# Patient Record
Sex: FEMALE | Race: WHITE | NOT HISPANIC OR LATINO | Employment: OTHER | URBAN - METROPOLITAN AREA
[De-identification: names, ages, dates, MRNs, and addresses within clinical notes are randomized per-mention and may not be internally consistent; named-entity substitution may affect disease eponyms.]

---

## 2018-07-05 ENCOUNTER — OFFICE VISIT (OUTPATIENT)
Dept: OBSTETRICS AND GYNECOLOGY | Facility: CLINIC | Age: 43
End: 2018-07-05

## 2018-07-05 VITALS
HEIGHT: 66 IN | WEIGHT: 180.2 LBS | SYSTOLIC BLOOD PRESSURE: 93 MMHG | DIASTOLIC BLOOD PRESSURE: 63 MMHG | BODY MASS INDEX: 28.96 KG/M2 | HEART RATE: 82 BPM

## 2018-07-05 DIAGNOSIS — N64.4 NIPPLE PAIN: Primary | ICD-10-CM

## 2018-07-05 DIAGNOSIS — R14.0 ABDOMINAL BLOATING: ICD-10-CM

## 2018-07-05 DIAGNOSIS — R63.4 WEIGHT LOSS: ICD-10-CM

## 2018-07-05 DIAGNOSIS — N63.20 LEFT BREAST LUMP: ICD-10-CM

## 2018-07-05 DIAGNOSIS — R53.83 DECREASED ENERGY: ICD-10-CM

## 2018-07-05 DIAGNOSIS — Z11.3 SCREEN FOR STD (SEXUALLY TRANSMITTED DISEASE): ICD-10-CM

## 2018-07-05 DIAGNOSIS — Z12.4 SCREENING FOR CERVICAL CANCER: ICD-10-CM

## 2018-07-05 DIAGNOSIS — R10.32 LLQ PAIN: ICD-10-CM

## 2018-07-05 PROCEDURE — 99204 OFFICE O/P NEW MOD 45 MIN: CPT | Performed by: OBSTETRICS & GYNECOLOGY

## 2018-07-05 NOTE — PROGRESS NOTES
Subjective   Abena Alamo is a 43 y.o. female here as new patient. Pt c/o bloating, nasuea, tired, breast tenderness, lighter periods.  Neg pregnancy test    History of Present Illness   Patient presents as a new patient to me for evaluation of several vague in possibly unrelated complaints.  The patient complains of nipples increasing in size, nipple tenderness, abdominal bloating, left lower quadrant pain, nausea and vomiting, decreased energy.  She reports that she has seen her primary care physician who did some blood work recently, but she is unaware of what blood work was performed.  There were no records sent from her primary care physician other than a pelvic ultrasound which was performed October 2017.  Patient states at that time she had the ultrasound performed that she had started having some pain with intercourse, but that has subsequently resolved.  The patient states that she believes in holistic medicine and does not like to take classical medicines.  She takes a few types of herbal supplements that are not familiar to me.  Patient states that she does not believe in mammograms and would never have biopsies performed, even if there were very concerning or suspicious mass seen on mammogram or ultrasound, but she does not believe in standard breast cancer therapies.  She states that she would try her on holistic therapy herself.  Bleeding back to her primary concerns, the patient states her biggest concern is with nipple tenderness.  She states this is very upsetting to her.  She also feels that her nipples have changed in size.  She states this is despite about a 15 pound weight loss over the last 1-2 months.  She reports significant recent stressful life events including problems at work and health issues related to her close friend of hers.  Patient has never had a mammogram 4.  She has no family history of breast cancer.  Patient reports minimal caffeine intake, limited to about a cup of green tea  per day.  It is unclear what sorts of caffeine or naturally occurring caffeine may be in some of her herbal supplements however.  Patient is not a smoker.  Patient also reports vague symptoms of abdominal bloating and abdominal discomfort.  Patient states that she has tried elimination diets in the past.  Recently she's been on a liquid only cleanse.  She has also recently tried raw diet.  She denies constipation or diarrhea and reports normal daily bowel movements.  Patient also reports just vague generalized fatigue.  Patient states that she felt like her recent symptoms may have been due to pregnancy.  She is sexually active and she has never been on contraception in the past.  Her last child was born 16 years ago.  Patient states that she took pregnancy test at home that were negative.  Patient states that she cannot regular last time that she had a Pap smear.  She denies any history of sexually transmitted diseases    Obstetric History       T1      L1     SAB0   TAB0   Ectopic0   Molar0   Multiple0   Live Births1       # Outcome Date GA Lbr Christian/2nd Weight Sex Delivery Anes PTL Lv   1 Term 92    F Vag-Spont   RUSS        History reviewed. No pertinent past medical history.     Past Surgical History:   Procedure Laterality Date   • CHOLECYSTECTOMY     • EYE SURGERY       History reviewed. No pertinent family history.     Social History   Substance Use Topics   • Smoking status: Former Smoker   • Smokeless tobacco: Never Used   • Alcohol use No     Allergies   Allergen Reactions   • Amoxicillin Nausea And Vomiting   • Codeine Nausea And Vomiting   • Penicillins Nausea And Vomiting   • Sulfa Antibiotics Nausea And Vomiting   • Latex Rash     No current outpatient prescriptions on file.    Review of Systems  General: No fever or chills  Constitutional: Pos weight loss, no hair loss  HENT: No headache, no hearing loss, no tinnitus  Eyes: normal vision, no eye pain  Lungs: No cough, no shortness of  "breath  Heart: No chest pain, no palpitations  Abdomen: Pos nausea, No vomiting, constipation or diarrhea  : No dysuria, no hematuria  Skin: No rashes  Lymph: Pos LE swelling  Neuro: No parathesia, no weakness  Psych: Normal though content, no hallucinations, no SI/HI    Objective   Physical Exam  Vitals:    07/05/18 1340   BP: 93/63   Pulse: 82   Weight: 81.7 kg (180 lb 3.2 oz)   Height: 167.6 cm (66\")   Patient's last menstrual period was 06/14/2018.   Exam performed in the presence of a female chaperone  Patient has provided verbal consent to proceed with exam.    Gen: No acute distress, awake and oriented times three  HENT: Normocephalic, atraumatic, Moist mucous membranes  Eyes: PERRLA, EOMI  Neck: Supple, normal range of motion, no thyromegaly  Lungs: Normal work of breathing, lungs clear bilaterally, no crackles/wheezes  Heart: Regular rate and rhythm, no murmurs  Abdomen: soft, nontender, non distended, normoactive bowel sounds  Breast: Symmetrical. No skin changes or nipple retractions. There is no discharge from the nipples.  Nipples generally appear normal.  On the left breast in the lower inner quadrant at about the 7 o'clock position is about a 1 x 1 cm smooth round nontender lump.  There is also a small cluster of lumps just medial to this aspect as well.  Right breast is normal to palpation with no lumps or tenderness.  Pelvic:   Normal external female genitalia, no lesions  Vagina: No blood or discharge  Cervix: No cervical motion tenderness, no lesions, no active bleeding, nonfriable  Uterus: Anteverted, normal size and shape, nontender  Adnexa: No masses or tenderness  Rectal: Deferred  Skin: Warm and dry, no rashes  Psych: Good judgement and insight, normal affect and mood  Neuro: CN 2-12 intact, no gross deficits    Assessment/Plan   Abena was seen today for new patient.    Diagnoses and all orders for this visit:    Nipple pain  -     Prolactin  -     Testosterone Free Direct  -     " Testosterone  -     Mammo Diagnostic Bilateral With CAD; Future  -     US breast left complete; Future         Patient is very skeptical and very hesitant proceed with any sort of breast imaging, because she states that she would not pursue any type of biopsy even of a have a suspicious lump on imaging.  Patient states that she does not believe in standard breast cancer therapies and would not seek out standard therapies.  Despite this, I would encourage her to go forward with imaging today as she does have some palpable lumps on the left breast.  Can also check hormone levels to see if there are hormonal changes or prolactin issues that cause the change in size for breast.  Advised the patient to minimize any caffeine intake.    LLQ pain  Abdominal bloating     -      Overall, I suspect that a GYN origin of her bloating and pain is unlikely.  We will get a pelvic ultrasound to exclude pelvic or adnexal masses.  I suspect this may be more related to GI sources, or even her diet.  Certainly diet exclusively consisting of fruits and raw vegetables may be likely to cause bloating, gas, and discomfort.  We will have her return in 2-4 weeks for ultrasound in GYN follow-up.  Left breast lump  -     Mammo Diagnostic Bilateral With CAD; Future  -     US breast left complete; Future    Weight loss  -     TSH Rfx On Abnormal To Free T4  -     Follicle Stimulating Hormone  -     Estradiol  -     Prolactin    Decreased energy  -     TSH Rfx On Abnormal To Free T4  -     Follicle Stimulating Hormone  -     Estradiol  -     Prolactin  -     Testosterone Free Direct  -     Testosterone    Screen for STD (sexually transmitted disease)  -     IGP, Apt HPV,rfx 16 / 18,45  -     Chlamydia trachomatis, Neisseria gonorrhoeae, Trichomonas vaginalis, PCR - Swab, Vagina    Screening for cervical cancer  -     IGP, Apt HPV,rfx 16 / 18,45    Overall, I feel that the patient's constellation of symptoms is unlikely related to a gynecologic  cause.  We will check basic hormonal evaluation today.  I have been unable to obtain the records from her primary care physician, but the patient states that she feels that no significant hormone levels were checked.  They did do a TSH which she states was mildly elevated, but no further testing was done.  She said she had a negative hCG level with her primary care physician.  Patient is also due for a Pap smear and gonorrhea and chlamydia testing, which were performed today as well.

## 2018-07-06 LAB
C TRACH RRNA SPEC QL NAA+PROBE: NEGATIVE
N GONORRHOEA RRNA SPEC QL NAA+PROBE: NEGATIVE
T VAGINALIS RRNA SPEC QL NAA+PROBE: NEGATIVE

## 2018-07-09 ENCOUNTER — TELEPHONE (OUTPATIENT)
Dept: OBSTETRICS AND GYNECOLOGY | Facility: CLINIC | Age: 43
End: 2018-07-09

## 2018-07-09 LAB
CYTOLOGIST CVX/VAG CYTO: NORMAL
CYTOLOGY CVX/VAG DOC THIN PREP: NORMAL
DX ICD CODE: NORMAL
HIV 1 & 2 AB SER-IMP: NORMAL
HPV I/H RISK 4 DNA CVX QL PROBE+SIG AMP: NEGATIVE
OTHER STN SPEC: NORMAL
PATH REPORT.FINAL DX SPEC: NORMAL
STAT OF ADQ CVX/VAG CYTO-IMP: NORMAL

## 2018-07-09 NOTE — TELEPHONE ENCOUNTER
----- Message from Mc Shelton MD sent at 7/8/2018  1:39 PM EDT -----  Notify the patient that her chlamydia/gonorrhea test was negative. I do not see any blood work results. Did she get it done?

## 2018-07-09 NOTE — TELEPHONE ENCOUNTER
Pt aware. She did not get blood drawn because she thought her PCP was going to send her most recent labs, however I called them and they'd only done CBC, CMP, lipids and UA. I told her the labs you ordered were different, she said she will come in and get those drawn.

## 2018-07-10 ENCOUNTER — TELEPHONE (OUTPATIENT)
Dept: OBSTETRICS AND GYNECOLOGY | Facility: CLINIC | Age: 43
End: 2018-07-10

## 2018-07-10 NOTE — TELEPHONE ENCOUNTER
Patient states that she refuses to have a mammogram.  Please notify the patient that I strongly recommend a mammogram both for screening for breast cancer and as well as a diagnostic approach for her breast lump.  Ultrasound alone is not sufficient to evaluate for these things.  If the patient still continues to refuse a mammogram, please at least see if she will schedule and perform her breast ultrasound.

## 2018-07-11 ENCOUNTER — TELEPHONE (OUTPATIENT)
Dept: OBSTETRICS AND GYNECOLOGY | Facility: CLINIC | Age: 43
End: 2018-07-11

## 2018-07-11 NOTE — TELEPHONE ENCOUNTER
----- Message from Mc Shelton MD sent at 7/9/2018  2:31 PM EDT -----  Notify the patient that her Pap was normal.

## 2018-07-11 NOTE — TELEPHONE ENCOUNTER
"I called to speak w/pt and tried to encourage her to schedule mammogram and, at least the breast u/s.  Pt stated she has already spoke w/Dr. Shelton about how strongly she does not want to have these procedures done.  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  Also states she feels like breast cancer \"can easily be healed on its own\", if that's what the dx ends up being.  "

## 2018-08-02 ENCOUNTER — PROCEDURE VISIT (OUTPATIENT)
Dept: OBSTETRICS AND GYNECOLOGY | Facility: CLINIC | Age: 43
End: 2018-08-02

## 2018-08-02 ENCOUNTER — OFFICE VISIT (OUTPATIENT)
Dept: OBSTETRICS AND GYNECOLOGY | Facility: CLINIC | Age: 43
End: 2018-08-02

## 2018-08-02 VITALS
DIASTOLIC BLOOD PRESSURE: 66 MMHG | WEIGHT: 179 LBS | BODY MASS INDEX: 28.77 KG/M2 | HEART RATE: 65 BPM | HEIGHT: 66 IN | SYSTOLIC BLOOD PRESSURE: 97 MMHG

## 2018-08-02 DIAGNOSIS — R14.0 ABDOMINAL BLOATING: ICD-10-CM

## 2018-08-02 DIAGNOSIS — R10.2 PELVIC PAIN IN FEMALE: Primary | ICD-10-CM

## 2018-08-02 DIAGNOSIS — R53.83 DECREASED ENERGY: ICD-10-CM

## 2018-08-02 DIAGNOSIS — N64.4 NIPPLE PAIN: ICD-10-CM

## 2018-08-02 DIAGNOSIS — D25.9 UTERINE LEIOMYOMA, UNSPECIFIED LOCATION: ICD-10-CM

## 2018-08-02 DIAGNOSIS — D25.1 INTRAMURAL LEIOMYOMA OF UTERUS: Primary | ICD-10-CM

## 2018-08-02 PROCEDURE — 76830 TRANSVAGINAL US NON-OB: CPT | Performed by: OBSTETRICS & GYNECOLOGY

## 2018-08-02 PROCEDURE — 99213 OFFICE O/P EST LOW 20 MIN: CPT | Performed by: OBSTETRICS & GYNECOLOGY

## 2018-08-02 NOTE — PROGRESS NOTES
"Aditya Alamo is a 43 y.o. female.   CC: Pt here for f/u of various symptoms including bloating, breast pain, decreased energy, etc.  History of Present Illness   Pt here for f/u of various symptoms including bloating, breast pain, decreased energy, etc.  Patient did not do the blood work previously ordered at her last visit until today.  Results are not available at this time.  She still notes mostly issues with decreased energy, fluctuating mood, fluctuating breast lump, and breast pain.  Patient continues to decline imaging of the breast such as mammogram and ultrasound because of her strong believes about homeopathic medicine.  She wonders if her decreased energy may be secondary to decreased estrogen levels because of her frequent cleanse diets.    The following portions of the patient's history were reviewed and updated as appropriate: allergies, current medications, past family history, past medical history, past social history, past surgical history and problem list.    Review of Systems  General: No fever or chills, pos decreased energy  Abdomen: No nausea, vomiting, constipation or diarrhea  : No dysuria, no hematuria  Lymph: No swelling  Neuro: No parathesia, no weakness  Psych: Normal though content, no hallucinations, no SI/HI    Objective   Physical Exam  Vitals:    08/02/18 1421   BP: 97/66   Pulse: 65   Weight: 81.2 kg (179 lb)   Height: 167.6 cm (66\")   Patient's last menstrual period was 07/21/2018.   Gen: No acute distress, awake and oriented times three  Psych: Good judgement and insight, normal affect and mood    Ultrasound today: Uterus normal size, nontender to maximum dimension.  There are 3 small fibroids.  The largest measures 1.8 cm in maximum dimension, the other 2 are less than 1 cm in maximum dimension.  Ovaries are normal appearing bilaterally.  There is no free fluid.    Assessment/Plan   Diagnoses and all orders for this visit:    Intramural leiomyoma of uterus  -  The 3 " uterine fibroids seen are very small and unlikely to be of any clinical significance.  I would not attribute these to any of her symptoms such as abdominal bloating, decreased energy, etc.  Abdominal bloating  -  No GYN cause related.  May be secondary to her diet.  Patient does eat a lot of raw vegetables, etc.    Recommend further follow-up with her primary care physician.  Decreased energy  - Still waiting lab results which the patient had drawn today.  We will follow up with these.  Nipple pain    -  I've encouraged the patient to undergo breast imaging is ordered previously.  She declines this at this time.  At this time there is no further therapy that I can offer her.  I have discussed with her explicitly the risk of breast cancer or nonmalignant breast lesion potentially causing her breast pain, but she declines any imaging    I spent 13 out of 15 minutes with the patient in face to face counseling of the above issues.

## 2018-08-03 PROBLEM — D25.1 INTRAMURAL LEIOMYOMA OF UTERUS: Status: ACTIVE | Noted: 2018-08-03

## 2018-08-03 LAB
ESTRADIOL SERPL-MCNC: 53.2 PG/ML
FSH SERPL-ACNC: 9.9 MIU/ML
PROLACTIN SERPL-MCNC: 28.6 NG/ML (ref 4.8–23.3)
TESTOST SERPL-MCNC: 29 NG/DL (ref 8–48)
TSH SERPL DL<=0.005 MIU/L-ACNC: 2.96 UIU/ML (ref 0.45–4.5)

## 2018-08-04 LAB — TESTOST FREE SERPL-MCNC: 1.4 PG/ML (ref 0–4.2)

## 2018-08-06 ENCOUNTER — TELEPHONE (OUTPATIENT)
Dept: OBSTETRICS AND GYNECOLOGY | Facility: CLINIC | Age: 43
End: 2018-08-06

## 2018-08-07 NOTE — TELEPHONE ENCOUNTER
Her estrogen levels and FSH levels show that she may be close to menopause, but should not be truly menopausal.  I do not think that those levels of the reason for her decreased energy.  Her thyroid test was normal.    Her prolactin level was slightly elevated.  This could be due to dietary changes.  Ultimately, prolactin is a hormone that is secreted from the pituitary gland and can cause milky production of the nipples.  It is only mildly elevated at this point and not the cause for significant concern.  I would recommend coming back in about 1 month to repeat the prolactin level.  The best time to do the test would be earlier in the day and with her fasting.

## 2018-08-07 NOTE — TELEPHONE ENCOUNTER
----- Message from Maria Dolores Burden sent at 8/6/2018  1:45 PM EDT -----  Pt called asking for all her her lab results from 8/2/2018. Please advise    Pt # is [phone]

## 2018-09-24 ENCOUNTER — TELEPHONE (OUTPATIENT)
Dept: OBSTETRICS AND GYNECOLOGY | Facility: CLINIC | Age: 43
End: 2018-09-24

## 2018-09-24 DIAGNOSIS — R79.89 ELEVATED PROLACTIN LEVEL: Primary | ICD-10-CM

## 2018-09-24 NOTE — TELEPHONE ENCOUNTER
Pt is ready to come in and have her 1 month FU labs done, if you can please place the order. I reminded her to come in the morning time and to fast. She also mentioned being 9 days late for her period. I suggested she take a pregnancy test & call us with the results.

## 2018-09-25 ENCOUNTER — RESULTS ENCOUNTER (OUTPATIENT)
Dept: OBSTETRICS AND GYNECOLOGY | Facility: CLINIC | Age: 43
End: 2018-09-25

## 2018-09-25 DIAGNOSIS — R79.89 ELEVATED PROLACTIN LEVEL: ICD-10-CM

## 2022-03-25 ENCOUNTER — OFFICE VISIT (OUTPATIENT)
Dept: OBSTETRICS AND GYNECOLOGY | Facility: CLINIC | Age: 47
End: 2022-03-25

## 2022-03-25 VITALS
WEIGHT: 187 LBS | HEIGHT: 66 IN | SYSTOLIC BLOOD PRESSURE: 132 MMHG | DIASTOLIC BLOOD PRESSURE: 72 MMHG | BODY MASS INDEX: 30.05 KG/M2

## 2022-03-25 DIAGNOSIS — N93.9 ABNORMAL UTERINE BLEEDING (AUB): ICD-10-CM

## 2022-03-25 DIAGNOSIS — Z01.419 ENCOUNTER FOR GYNECOLOGICAL EXAMINATION WITHOUT ABNORMAL FINDING: Primary | ICD-10-CM

## 2022-03-25 PROCEDURE — 2014F MENTAL STATUS ASSESS: CPT | Performed by: OBSTETRICS & GYNECOLOGY

## 2022-03-25 PROCEDURE — 99386 PREV VISIT NEW AGE 40-64: CPT | Performed by: OBSTETRICS & GYNECOLOGY

## 2022-03-25 PROCEDURE — 3008F BODY MASS INDEX DOCD: CPT | Performed by: OBSTETRICS & GYNECOLOGY

## 2022-03-25 RX ORDER — MELATONIN
1000 DAILY
COMMUNITY
Start: 2021-10-05 | End: 2022-10-05

## 2022-03-25 NOTE — PROGRESS NOTES
GYN Annual Exam     CC- Here for annual exam.     Abena Alamo is a 47 y.o. female who presents for annual well woman exam. Periods are Irregular and having daily bleeding for the past year and a half., lasting Daily days. Dysmenorrhea:none. Cyclic symptoms include none. No intermenstrual bleeding, spotting, or discharge.    She previously states that she had a menses about every 24 days that lasted 3 days like clockwork her whole life.  She has had some episodes of very heavy bleeding but mostly light bleeding that she would note when she wipes but still almost on a daily basis.  She denies any known health changes or new medications or other exposures other than she was living in a house that she found out had mold growing in it and moved out in 2021 and did note some improvement in the bleeding pattern but it did not go all the way back to normal for her.    OB History        1    Para   1    Term   1            AB        Living   1       SAB        IAB        Ectopic        Molar        Multiple        Live Births   1                Current contraception: none  History of abnormal Pap smear: no  Family history of uterine, colon or ovarian cancer: no  History of abnormal mammogram: no  Family history of breast cancer: no  Last Pap : 2018    Past Medical History:   Diagnosis Date   • Herpes    • Positive RPR test        Past Surgical History:   Procedure Laterality Date   • CHOLECYSTECTOMY     • EYE SURGERY     • LAPAROSCOPIC CHOLECYSTECTOMY     • WISDOM TOOTH EXTRACTION           Current Outpatient Medications:   •  cholecalciferol (VITAMIN D3) 25 MCG (1000 UT) tablet, Take 1,000 Units by mouth Daily., Disp: , Rfl:     Allergies   Allergen Reactions   • Amoxicillin Nausea And Vomiting   • Codeine Nausea And Vomiting   • Penicillins Nausea And Vomiting   • Sulfa Antibiotics Nausea And Vomiting   • Latex Rash       Social History     Tobacco Use   • Smoking status: Former Smoker   • Smokeless  "tobacco: Never Used   Vaping Use   • Vaping Use: Never used   Substance Use Topics   • Alcohol use: No   • Drug use: No       History reviewed. No pertinent family history.    Review of Systems   Constitutional: Negative for fever.   Respiratory: Negative for cough and shortness of breath.    Gastrointestinal: Negative for abdominal pain.   Genitourinary: Positive for menstrual problem and vaginal bleeding. Negative for urinary incontinence.       /72   Ht 167.6 cm (66\")   Wt 84.8 kg (187 lb)   BMI 30.18 kg/m²     Physical Exam  Constitutional:       Appearance: She is normal weight.   Genitourinary:      Bladder and urethral meatus normal.      No lesions in the vagina.      Right Labia: No lesions.     Left Labia: No lesions.     Vaginal bleeding present.      No vaginal granulation tissue.      No vaginal prolapse present.     No vaginal atrophy present.       Right Adnexa: not tender, not full and no mass present.     Left Adnexa: not tender, not full and no mass present.     Cervix is parous.      No cervical motion tenderness or polyp.      Uterus is enlarged and tender.      Uterus is not fixed.      No uterine mass detected.     Uterus is retroverted.   Breasts:      Right: No mass, nipple discharge, skin change or tenderness.      Left: Skin change present. No mass, nipple discharge or tenderness.       Neck:      Thyroid: No thyroid mass or thyromegaly.   Chest:       Abdominal:      General: Abdomen is flat.      Palpations: Abdomen is soft. There is no mass.      Tenderness: There is no abdominal tenderness.   Neurological:      Mental Status: She is alert.   Vitals reviewed.               Assessment     1) GYN annual well woman exam.   2) abnormal uterine bleeding, exam consistent with fibroid uterus.     Plan     1) Breast Health - Clinical breast exam yearly, Discussed [employer] cancer society recommendations for breast cancer screening, and Self breast awareness monthly  2) Pap -done with " high risk HPV  3) Smoking status-former smoker  4) Encouraged to be wary of information obtained via social media and internet based on source and search.  5) Follow up 1 week with pelvic ultrasound and possible need to do endometrial biopsy.  Will check CBC, TSH, LH and FSH and prolactin.      Gus Pagan MD   3/25/2022  10:26 EDT

## 2022-03-26 LAB
BASOPHILS # BLD AUTO: 0 X10E3/UL (ref 0–0.2)
BASOPHILS NFR BLD AUTO: 1 %
EOSINOPHIL # BLD AUTO: 0.1 X10E3/UL (ref 0–0.4)
EOSINOPHIL NFR BLD AUTO: 1 %
ERYTHROCYTE [DISTWIDTH] IN BLOOD BY AUTOMATED COUNT: 13.3 % (ref 11.7–15.4)
FSH SERPL-ACNC: 7 MIU/ML
HCT VFR BLD AUTO: 35 % (ref 34–46.6)
HGB BLD-MCNC: 11.4 G/DL (ref 11.1–15.9)
IMM GRANULOCYTES # BLD AUTO: 0 X10E3/UL (ref 0–0.1)
IMM GRANULOCYTES NFR BLD AUTO: 0 %
LH SERPL-ACNC: 2.2 MIU/ML
LYMPHOCYTES # BLD AUTO: 1.5 X10E3/UL (ref 0.7–3.1)
LYMPHOCYTES NFR BLD AUTO: 19 %
MCH RBC QN AUTO: 29.7 PG (ref 26.6–33)
MCHC RBC AUTO-ENTMCNC: 32.6 G/DL (ref 31.5–35.7)
MCV RBC AUTO: 91 FL (ref 79–97)
MONOCYTES # BLD AUTO: 0.6 X10E3/UL (ref 0.1–0.9)
MONOCYTES NFR BLD AUTO: 7 %
NEUTROPHILS # BLD AUTO: 5.9 X10E3/UL (ref 1.4–7)
NEUTROPHILS NFR BLD AUTO: 72 %
PLATELET # BLD AUTO: 231 X10E3/UL (ref 150–450)
PROLACTIN SERPL-MCNC: 25.3 NG/ML (ref 4.8–23.3)
RBC # BLD AUTO: 3.84 X10E6/UL (ref 3.77–5.28)
TSH SERPL DL<=0.005 MIU/L-ACNC: 3.6 UIU/ML (ref 0.45–4.5)
WBC # BLD AUTO: 8.2 X10E3/UL (ref 3.4–10.8)

## 2022-03-27 LAB
A VAGINAE DNA VAG QL NAA+PROBE: ABNORMAL SCORE
BVAB2 DNA VAG QL NAA+PROBE: ABNORMAL SCORE
C ALBICANS DNA VAG QL NAA+PROBE: NEGATIVE
C GLABRATA DNA VAG QL NAA+PROBE: NEGATIVE
MEGA1 DNA VAG QL NAA+PROBE: ABNORMAL SCORE

## 2022-03-28 RX ORDER — METRONIDAZOLE 500 MG/1
500 TABLET ORAL 2 TIMES DAILY
Qty: 14 TABLET | Refills: 1 | Status: SHIPPED | OUTPATIENT
Start: 2022-03-28 | End: 2022-04-04

## 2022-04-04 LAB
CYTOLOGIST CVX/VAG CYTO: NORMAL
CYTOLOGY CVX/VAG DOC CYTO: NORMAL
CYTOLOGY CVX/VAG DOC THIN PREP: NORMAL
DX ICD CODE: NORMAL
HIV 1 & 2 AB SER-IMP: NORMAL
HPV I/H RISK 4 DNA CVX QL PROBE+SIG AMP: NEGATIVE
Lab: NORMAL
OTHER STN SPEC: NORMAL
RECOM F/U CVX/VAG CYTO: NORMAL
STAT OF ADQ CVX/VAG CYTO-IMP: NORMAL

## 2022-04-12 ENCOUNTER — TELEPHONE (OUTPATIENT)
Dept: OBSTETRICS AND GYNECOLOGY | Facility: CLINIC | Age: 47
End: 2022-04-12